# Patient Record
Sex: MALE | Race: WHITE | NOT HISPANIC OR LATINO | Employment: FULL TIME | URBAN - METROPOLITAN AREA
[De-identification: names, ages, dates, MRNs, and addresses within clinical notes are randomized per-mention and may not be internally consistent; named-entity substitution may affect disease eponyms.]

---

## 2017-02-16 ENCOUNTER — SKIN CANCER EXAM (OUTPATIENT)
Dept: URBAN - METROPOLITAN AREA CLINIC 31 | Facility: CLINIC | Age: 47
Setting detail: DERMATOLOGY
End: 2017-02-16

## 2017-02-16 PROBLEM — L71.8 OTHER ROSACEA: Status: RESOLVED | Noted: 2017-02-16

## 2017-02-16 PROBLEM — L71.8 OTHER ROSACEA: Status: ACTIVE | Noted: 2017-02-16

## 2017-02-16 PROCEDURE — 99214 OFFICE O/P EST MOD 30 MIN: CPT

## 2017-02-16 RX ORDER — METRONIDAZOLE 10 MG/G
1 APPLICATION GEL TOPICAL DAILY
Qty: 60 | Refills: 3
Start: 2017-02-16

## 2017-08-24 ENCOUNTER — RX ONLY (RX ONLY)
Age: 47
End: 2017-08-24

## 2017-08-24 RX ORDER — METRONIDAZOLE 10 MG/G
1 APPLICATION GEL TOPICAL DAILY
Qty: 60 | Refills: 3
Start: 2017-08-24

## 2017-10-19 ENCOUNTER — RX ONLY (RX ONLY)
Age: 47
End: 2017-10-19

## 2017-10-19 RX ORDER — METRONIDAZOLE 10 MG/G
1 APPLICATION GEL TOPICAL DAILY
Qty: 60 | Refills: 2
Start: 2017-10-19

## 2019-08-20 ENCOUNTER — RASH (OUTPATIENT)
Dept: URBAN - METROPOLITAN AREA CLINIC 31 | Facility: CLINIC | Age: 49
Setting detail: DERMATOLOGY
End: 2019-08-20

## 2019-08-20 DIAGNOSIS — L40.0 PSORIASIS VULGARIS: ICD-10-CM

## 2019-08-20 PROCEDURE — 99213 OFFICE O/P EST LOW 20 MIN: CPT

## 2019-08-20 RX ORDER — CLOBETASOL PROPIONATE 0.5 MG/G
1 APPLICATION CREAM TOPICAL BID
Qty: 60 | Refills: 3
Start: 2019-08-20

## 2021-07-08 ENCOUNTER — RX ONLY (RX ONLY)
Age: 51
End: 2021-07-08

## 2021-07-08 ENCOUNTER — SEE NOTE (OUTPATIENT)
Dept: URBAN - METROPOLITAN AREA CLINIC 31 | Facility: CLINIC | Age: 51
Setting detail: DERMATOLOGY
End: 2021-07-08

## 2021-07-08 DIAGNOSIS — L70.0 ACNE VULGARIS: ICD-10-CM

## 2021-07-08 PROBLEM — L71.8 OTHER ROSACEA: Status: RESOLVED | Noted: 2021-07-08

## 2021-07-08 PROBLEM — L57.0 ACTINIC KERATOSIS: Status: RESOLVED | Noted: 2021-07-08

## 2021-07-08 PROCEDURE — 17000 DESTRUCT PREMALG LESION: CPT

## 2021-07-08 PROCEDURE — 99213 OFFICE O/P EST LOW 20 MIN: CPT

## 2021-07-08 RX ORDER — CLOBETASOL PROPIONATE 0.5 MG/G
A SMALL AMOUNT CREAM TOPICAL EVERY NIGHT
Qty: 30 | Refills: 2
Start: 2021-07-08

## 2021-07-08 RX ORDER — IVERMECTIN 10 MG/G
A SMALL AMOUNT CREAM TOPICAL ONCE A DAY
Qty: 45 | Refills: 2
Start: 2021-07-08

## 2021-07-13 ENCOUNTER — RX ONLY (RX ONLY)
Age: 51
End: 2021-07-13

## 2021-07-13 RX ORDER — AZELAIC ACID 0.15 G/G
GEL TOPICAL
Qty: 50 | Refills: 6
Start: 2021-07-13

## 2023-03-27 ENCOUNTER — APPOINTMENT (OUTPATIENT)
Dept: URBAN - METROPOLITAN AREA CLINIC 206 | Age: 53
Setting detail: DERMATOLOGY
End: 2023-03-30

## 2023-03-27 DIAGNOSIS — L71.8 OTHER ROSACEA: ICD-10-CM

## 2023-03-27 PROCEDURE — OTHER PATIENT SPECIFIC COUNSELING: OTHER

## 2023-03-27 PROCEDURE — OTHER ADDITIONAL NOTES: OTHER

## 2023-03-27 PROCEDURE — 99213 OFFICE O/P EST LOW 20 MIN: CPT

## 2023-03-27 PROCEDURE — OTHER COUNSELING: OTHER

## 2023-03-27 PROCEDURE — OTHER MIPS QUALITY: OTHER

## 2023-03-27 PROCEDURE — OTHER PRESCRIPTION: OTHER

## 2023-03-27 RX ORDER — OXYMETAZOLINE HYDROCHLORIDE 1 G/100G
CREAM TOPICAL QAM
Qty: 30 | Refills: 6 | Status: ERX | COMMUNITY
Start: 2023-03-27

## 2023-03-27 RX ORDER — IVERMECTIN 10 MG/G
CREAM TOPICAL
Qty: 45 | Refills: 6 | Status: ERX | COMMUNITY
Start: 2023-03-27

## 2023-03-27 ASSESSMENT — LOCATION SIMPLE DESCRIPTION DERM: LOCATION SIMPLE: LEFT CHEEK

## 2023-03-27 ASSESSMENT — LOCATION DETAILED DESCRIPTION DERM: LOCATION DETAILED: LEFT CENTRAL MALAR CHEEK

## 2023-03-27 ASSESSMENT — LOCATION ZONE DERM: LOCATION ZONE: FACE

## 2023-03-27 NOTE — HPI: RASH
How Severe Is Your Rash?: mild
Is This A New Presentation, Or A Follow-Up?: Rash
Additional History: Pt has history of Rosacea. Pt question any other treatment options, never got the Soolantra as prescribed due to cost.

## 2023-03-27 NOTE — PROCEDURE: ADDITIONAL NOTES
This visit is a TeleHealth encounter (During PEWYI-00 public health emergency). Pursuant to the emergency declaration under the [address] [place] [address] and the [employer] Act, this Virtual Visit was conducted with patient's (and/or legal guardian's) consent, to reduce the patient's risk of exposure to COVID-19 and provide necessary medical care. The patient (and/or legal guardian) has also been advised to contact this office for worsening conditions or problems, and seek emergency medical treatment and/or call 911 if deemed necessary. Patient identification was verified at the start of the visit: Yes    Total time spent for this encounter: 1 hour    This encounter was done as one on one virtual /  phone visit as no group sessions being offered for 2 months due to covid - 19 pandemic    Services were provided through a video synchronous discussion virtually to substitute for in-person clinic visit. Patient and provider were located at their individual home/office. 1/28/21CB  1 hour by video  2/4/21CB  45 min video   2/10/21 JW 1 hour doxy video  2/17/21CB  1 hour by doxy video  3/3/21 via doxy - 50 min      Also see Diabetic Screening  Patient, Xiomara jb Archer for diabetes self-management education  assessment on 1/28/21       MEDICAL HISTORY:  Past Medical History:   Diagnosis Date    Hyperlipidemia     Hypertension     Obesity     Pancreatitis     drug induced    RAD (reactive airway disease)     Type 2 diabetes mellitus without complication ([facility] [address]      Family History   Problem Relation Age of Onset    Other Mother         kidney Dx.     Diabetes Mother     Heart Disease Mother     High Blood Pressure Mother     High Blood Pressure Other      Trulicity [dulaglutide]   Immunization History   Administered Date(s) Administered    Tdap (Boostrix, Adacel) 07/09/2008     Current Medications  Current
Outpatient Medications   Medication Sig Dispense Refill    colchicine (COLCRYS) 0.6 MG tablet Take 1 tablet by mouth 2 times daily as needed for Pain 60 tablet 1    allopurinol (ZYLOPRIM) 100 MG tablet Take 1 tablet by mouth daily 90 tablet 1    potassium chloride (KLOR-CON M) 15 MEQ extended release tablet Take 1 tablet by mouth daily 90 tablet 1    Insulin Degludec (TRESIBA FLEXTOUCH) 200 UNIT/ML SOPN Inject 40 Units into the skin daily 5 pen 5    metFORMIN (GLUCOPHAGE XR) 500 MG extended release tablet Take 1 tablet by mouth daily (with breakfast) 90 tablet 1    losartan (COZAAR) 100 MG tablet Take 1 tablet by mouth daily 90 tablet 1    amLODIPine (NORVASC) 10 MG tablet Take 1 tablet by mouth daily 90 tablet 1    rosuvastatin (CRESTOR) 40 MG tablet Take 1 tablet by mouth daily 90 tablet 1    Insulin Pen Needle 31G X 8 MM MISC 1 each by Does not apply route daily 100 each 3     No current facility-administered medications for this encounter.    :     Comments:  Allergies: Allergies   Allergen Reactions    Trulicity [Dulaglutide]        A1C blood level   No results found for: LABA1C  Lab Results   Component Value Date    CREATININE 0.72 02/02/2021       Blood pressure   BP Readings from Last 3 Encounters:   01/20/21 138/82   04/06/16 163/78   03/09/16 130/86        Cholesterol  No results found for: [address], LDLCHOLESTEROL, LDLDIRECT     Diabetes Self- Management Education Record    Participant Name: Kei Flores  Referring Provider: YAIMA Hickman CNP   Assessment/Evaluation Ratings:  1=Needs Instruction   4=Demonstrates Understanding/Competency  2=Needs Review   NC=Not Covered    3=Comprehends Key Points  N/A=Not Applicable  Topics/Learning Objectives Pre-session Assess Date:  1/28/21CB Instr. Date Reinforce Date Post- session Eval Comments   Diabetes disease process & Treatment process: Define diabetes & pre-diabetes;  Identify own type of diabetes; role of the pancreas; signs/symptoms;
diagnostic criteria; prevention & treatment options; contributing factors. 1 2/4/21CB 2/17/21CB  New Dx in Dec and was having classic signs and labs revealed T2DM. Started on Trulicity and metformin but in 2 week period developed pancreatitis and very sick with vomiting. [facility] Switched him to Lantus 40 units 2x/d with Metformin but now on Tresisba 200 unit/ml 40 units 1x/d with Metformin. Family Hx with mom. 1/28/21CB    Did not receive packet yet but shared page number of material discussed and also was able to watch You tube resource \"Understanding T2DM\"2/4/21CB    Still did not receive packet yet so new packet sent and included lab work, [employer] your numbers\" handout2/17/21CB   Incorporating nutritional management into lifestyle: Describe effect of type, amount & timing of food on blood glucose; Describe basic meal planning techniques & current nutrition guideline   2  has been looking on internet and reading labels for CHO and eating low CHO diet. Prior ,used to not eat breakfast,lunch because work too busy. 5:15-5:30 cottage cheese w/applsauce or egg quiche or sandwich, reduced sugar OJ    Snack of almonds ,string cheese    11:30-12n eats at desk lo carb tortilla  With tuna or egg salad,cheese whips,packs veggies and dip,cottage cheese with peaches in own juice    Protein snack    5-6p no pasta/rice but eats  Soybean pasta, salad    Drinks mainly water, 1-2 c coke0, crystal light  1/28/21CB        2/10/21 JW praised progress made as has made many changes and bs improved. Showed video re: healthy eating. 3/3/21rs   What to eat - Food groups, When to eat - timing of meals and snacks, and How much to eat - portions control. calories/ day   CHO choices/ meal 4-5 cho/meal and 1-2 cho/snack   CHO choices/  day   grams of protein /day   gram of fat /day     Correctly read food labels & demonstrate CHO counting & portion control with personalized meal plan.  Identify dining out strategies, & dietary
Additional Notes: Vbeam discussed for further treatment 2-3 treatments for $200 each treatment.
Detail Level: Simple
benefit in his BG and weight loss   Individualized goal selection. 1 1/28/21CB    My goal , to help me improve my health, I will: 1. Be more active - try to use slim cycle 30 3x/w - walking at work  - be more active with spring/ summer activity like camping          Plan  Follow-up Appointments planned - 3 mo follow up by video     Instruction Method: [x]Lecture/Discussion  []Power Point Presentation  [x]Handouts  []Return Demonstration    Education Materials/Equipment Provided (VIA Mail for phone visits)  :    [x]Self-Management - Initial assessment - Enrolment in to ADA  Where do I Begin, Living with Type 2 diabetes ADA home support program and  handout on diabetes education classes. 1/28/21CB  [x]Self-Management  Class 1 -Self-Management  Class 1 - \"How to Thrive: A guide for Your Journey with Diabetes\" ADA booklet 2020 -pages 4, 11- 15 , 18 -19              2/4/21CB  o one day food diary and envelope for return of diet HX   o  You tube and website resource sheet-Understanding Type 2 Diabetes from Animated Diabetes Patient [email or web address]      [x] Self-Management  Class 2 - Meal Plan and handout for serving sizes, smarter snacking, Ready Set Carb Counting / Plate Method, Nutrient Conversion and International Diabetes [address] Eating for People with Diabetes and Nutrition in the [place] [address] - fast facts about fast food and \"How to Thrive: A guide for Your journey with Diabetes\" - ADA booklet 2020  - pages 217 Edie Jacques -17 2/10/21     [x] Self-Management  Class 3 -   \"How to Thrive: A guide for Your Journey with Diabetes\"  pages 6- 9 &  21 - 28,  type 2 diabetes and the role of GLP- 1,  Individualized Diabetes report card 2/17/21CB    [x] Self-Management Class 4 - BD Booklet  Sick Day Port Bib and  Dinning Out Guide , recipe hand outs and tips, diabetes Cookbooks  ( when available), & \"How to Thrive: A guide for Your journey with Diabetes\" - ADA booklet 2020  - pages 39 -44
--3/3/21rs     []Self-Management - 3 month follow -  AADE7 Self care behaviors work sheets,  Online resource list - March 2020 ,  How to Thrive: A guide for Your journey with Diabetes\" - ADA booklet 2020  - pages 39. []Self-Management  Gestational - RN class -Resource materials sent out : care booklet - \" Gestational Diabetes Mellitus ( GDM) toolkit form [place] gestational diabetes postpartum care learning collaborative 2018. \"Simple Guidelines for meal planning with gestational diabetes. SMBG sheets to fax back to M weekly. BD  healthy injection site selection and rotation with 6 mm insulin syringe and 4 mm pen needle. Gestational diabetes handout from [facility] 2016. Did you have gestational diabetes when you were pregnant? Handout from [facility]  April 2014    []Self-Management Gestational - RD class - My Food Plan for Gestational diabetes    []Glucose Meter     []Insulin Kit     []Other      Encounter Type Date Start Time End Time Comments No Show Dates   Assessment 1/28/21CB   4:30 5:30   []In Person  []Telephone  By doxy video with wife    Class 1 - Understanding diabetes 2/4/21CB 4:40 5:20  []TelephoneAmerican Diabetes Association  www. diabetes. org  By doxy video, wife not present      Class 2- Nutrition and diabetes    2/10/21 JW 4:15 5:15 [x]Doxy Telephone  Healthy Eating with Diabetes- Automatic Data of Diabetes and Digestive and Kidney   [email or web address] [email or web address]    Class 3 - Preventing Complications 8/60/29LM 6:15 5:30  []Telephone  Doxy video    Mailed out another packet    Class 4 -  In depth Nutrition and sick day care 3/3/21rs   300   3 50  [x]Telephone  Diabetes Food hub  www. diabetesfoodhub.org     Class 5 - 3 month follow up / goal reassessment        Gestational - RN         Gestational - RD        Individual MNT         Shared Med Appt         Yearly Follow-up        Meter Instrx      How to Measure Your Blood Sugar - [facility] Patient Education  [email or web address]
Render Risk Assessment In Note?: no
Insulin Instrx      []Pen  []Vial & Syringe   BD Diabetes Care: How to Inject Insulin with a Pen Needle  https://[email or web address] [email or web address]    Diabetes Care: How to Inject Insulin with a Syringe  [email or web address] [email or web address]       DSMS Support :   [] MNT      [] Annual update     [] Starting Fresh  adults living with diabetes or pre diabetes. [address] [place] [address] [phone] call for dates    []  Diabetes Group at  [address] - Free 6 week diabetes education support   classes - use web site interest form found at  [employer].pt - to enroll       []ADA  Where do I Begin, Living with Type 2 diabetes ADA home support program  Web site: diabetes. org/living    Call: 1800 DIABETES  e-mail: [email or web address] org     [x]  Internet web sites - ADAWeb site: diabetes. org and diabetesfoodhub.org-- 3/3/21rs     Post Education Referrals:      [] [place] Tobacco Quit information sheet and [address] [facility] [address]       [] Dental care - [facility] care of [facility]     [] [facility] ([facility]) link  phone number - for information and referral to [facility]  Clinically  [address] [facility], WEIGHT MANAGEMENT        []Other  Juan Pablo Ramírez RN